# Patient Record
(demographics unavailable — no encounter records)

---

## 2025-05-27 NOTE — ASSESSMENT
[FreeTextEntry1] : 76 y/o M pt presents with a one-week history of increased urinary frequency, urgency, and incontinence.  Pt managed well on the oxybutnin and tamsulosin. Pt has not experienced significant prostate issues recently. He is follows up with [facility] for his stomach cancer, s/p stomach resection. He gets his PSAs checked every 3 months and self reports that the PSA values have been wnl.  Urinary frequency, urgency, and incontinence. -continue oxybutnin and tamsulosin -limit fluid intake after 7pm -followup in 1 year

## 2025-05-27 NOTE — HISTORY OF PRESENT ILLNESS
[FreeTextEntry1] : 74 y/o M pt presents with a one-week history of increased urinary frequency, urgency, and incontinence.  Pt managed well on the oxybutnin and tamsulosin. Pt has not experienced significant prostate issues recently. He is follows up with [facility] for his stomach cancer, s/p stomach resection. He gets his PSAs checked every 3 months and self reports that the PSA values have been wnl.

## 2025-05-27 NOTE — HISTORY OF PRESENT ILLNESS
[FreeTextEntry1] : 74 y/o M pt presents with a one-week history of increased urinary frequency, urgency, and incontinence.  Pt managed well on the oxybutnin and tamsulosin. Pt has not experienced significant prostate issues recently. He is follows up with [facility] for his stomach cancer, s/p stomach resection. He gets his PSAs checked every 3 months and self reports that the PSA values have been wnl.

## 2025-05-27 NOTE — PHYSICAL EXAM
[General Appearance - Well Developed] : well developed [General Appearance - Well Nourished] : well nourished [Normal Appearance] : normal appearance [Edema] : no peripheral edema [] : no respiratory distress [Prostate Tenderness] : the prostate was not tender [No Prostate Nodules] : no prostate nodules [Normal Station and Gait] : the gait and station were normal for the patient's age [Skin Color & Pigmentation] : normal skin color and pigmentation [No Focal Deficits] : no focal deficits [Oriented To Time, Place, And Person] : oriented to person, place, and time [Chaperone Present] : A chaperone was present in the examining room during all aspects of the physical examination [FreeTextEntry2] : Jacob HAHN

## 2025-05-27 NOTE — ASSESSMENT
[FreeTextEntry1] : 76 y/o M pt presents with a one-week history of increased urinary frequency, urgency, and incontinence.  Pt managed well on the oxybutnin and tamsulosin. Pt has not experienced significant prostate issues recently. He is follows up with [facility] for his stomach cancer, s/p stomach resection. He gets his PSAs checked every 3 months and self reports that the PSA values have been wnl.  Urinary frequency, urgency, and incontinence. -continue oxybutnin and tamsulosin -limit fluid intake after 7pm -followup in 1 year